# Patient Record
Sex: FEMALE | URBAN - METROPOLITAN AREA
[De-identification: names, ages, dates, MRNs, and addresses within clinical notes are randomized per-mention and may not be internally consistent; named-entity substitution may affect disease eponyms.]

---

## 2023-09-20 ENCOUNTER — APPOINTMENT (RX ONLY)
Dept: URBAN - METROPOLITAN AREA CLINIC 38 | Facility: CLINIC | Age: 33
Setting detail: DERMATOLOGY
End: 2023-09-20

## 2023-09-20 DIAGNOSIS — D485 NEOPLASM OF UNCERTAIN BEHAVIOR OF SKIN: ICD-10-CM

## 2023-09-20 DIAGNOSIS — R21 RASH AND OTHER NONSPECIFIC SKIN ERUPTION: ICD-10-CM

## 2023-09-20 PROBLEM — D48.5 NEOPLASM OF UNCERTAIN BEHAVIOR OF SKIN: Status: ACTIVE | Noted: 2023-09-20

## 2023-09-20 PROCEDURE — 99203 OFFICE O/P NEW LOW 30 MIN: CPT

## 2023-09-20 PROCEDURE — ? DEFER

## 2023-09-20 PROCEDURE — ? COUNSELING

## 2023-09-20 PROCEDURE — ? PHOTO-DOCUMENTATION

## 2023-09-20 NOTE — PROCEDURE: DEFER
Procedure To Be Performed At Next Visit: Biopsy by punch method
X Size Of Lesion In Cm (Optional): 0
Detail Level: Detailed
Instructions (Optional): Site: A. Right Forearm \\nSize: 3.0mm\\nR/O: ERYTHEMA NODOSUMA vs HERPETIC vs LUPUS vs TINEA vs PSORIASIS vs SCABIES\\n\\nSite: A. Left Forearm \\nSize: 3.0mm\\nR/O: ERYTHEMA NODOSUMA vs HERPETIC vs LUPUS vs TINEA vs PSORIASIS vs SCABIES
Introduction Text (Please End With A Colon): Codes to be deferred: 86545, [id number]